# Patient Record
Sex: FEMALE | Race: BLACK OR AFRICAN AMERICAN | NOT HISPANIC OR LATINO | RURAL
[De-identification: names, ages, dates, MRNs, and addresses within clinical notes are randomized per-mention and may not be internally consistent; named-entity substitution may affect disease eponyms.]

---

## 2021-04-20 ENCOUNTER — HISTORICAL (OUTPATIENT)
Dept: ADMINISTRATIVE | Facility: HOSPITAL | Age: 76
End: 2021-04-20

## 2024-02-15 ENCOUNTER — HOSPITAL ENCOUNTER (EMERGENCY)
Facility: HOSPITAL | Age: 79
Discharge: HOME | End: 2024-02-15
Payer: MEDICARE

## 2024-02-15 VITALS
HEIGHT: 63 IN | TEMPERATURE: 99 F | OXYGEN SATURATION: 99 % | WEIGHT: 248.13 LBS | BODY MASS INDEX: 43.96 KG/M2 | SYSTOLIC BLOOD PRESSURE: 138 MMHG | RESPIRATION RATE: 18 BRPM | HEART RATE: 83 BPM | DIASTOLIC BLOOD PRESSURE: 78 MMHG

## 2024-02-15 DIAGNOSIS — E83.42 HYPOMAGNESEMIA: ICD-10-CM

## 2024-02-15 DIAGNOSIS — R09.02 HYPOXIA: ICD-10-CM

## 2024-02-15 DIAGNOSIS — J45.901 SEVERE ASTHMA WITH EXACERBATION, UNSPECIFIED WHETHER PERSISTENT: ICD-10-CM

## 2024-02-15 DIAGNOSIS — R06.2 WHEEZING: ICD-10-CM

## 2024-02-15 DIAGNOSIS — J98.4 PNEUMONITIS: Primary | ICD-10-CM

## 2024-02-15 DIAGNOSIS — J10.1 INFLUENZA A: ICD-10-CM

## 2024-02-15 LAB
ALBUMIN SERPL BCP-MCNC: 2.5 G/DL (ref 3.5–5)
ALBUMIN/GLOB SERPL: 0.6 {RATIO}
ALP SERPL-CCNC: 102 U/L (ref 55–142)
ALT SERPL W P-5'-P-CCNC: 35 U/L (ref 13–56)
ANION GAP SERPL CALCULATED.3IONS-SCNC: 9 MMOL/L (ref 7–16)
AST SERPL W P-5'-P-CCNC: 27 U/L (ref 15–37)
BASOPHILS # BLD AUTO: 0.03 K/UL (ref 0–0.2)
BASOPHILS NFR BLD AUTO: 0.6 % (ref 0–1)
BILIRUB SERPL-MCNC: 0.3 MG/DL (ref ?–1.2)
BUN SERPL-MCNC: 20 MG/DL (ref 7–18)
BUN/CREAT SERPL: 12 (ref 6–20)
CALCIUM SERPL-MCNC: 8.5 MG/DL (ref 8.5–10.1)
CHLORIDE SERPL-SCNC: 102 MMOL/L (ref 98–107)
CO2 SERPL-SCNC: 32 MMOL/L (ref 21–32)
CREAT SERPL-MCNC: 1.67 MG/DL (ref 0.55–1.02)
DIFFERENTIAL METHOD BLD: ABNORMAL
EGFR (NO RACE VARIABLE) (RUSH/TITUS): 31 ML/MIN/1.73M2
EOSINOPHIL # BLD AUTO: 0.14 K/UL (ref 0–0.5)
EOSINOPHIL NFR BLD AUTO: 2.7 % (ref 1–4)
ERYTHROCYTE [DISTWIDTH] IN BLOOD BY AUTOMATED COUNT: 13.7 % (ref 11.5–14.5)
FLUAV AG UPPER RESP QL IA.RAPID: POSITIVE
FLUBV AG UPPER RESP QL IA.RAPID: NEGATIVE
GLOBULIN SER-MCNC: 4 G/DL (ref 2–4)
GLUCOSE SERPL-MCNC: 293 MG/DL (ref 74–106)
GLUCOSE SERPL-MCNC: 311 MG/DL (ref 70–105)
HCO3 UR-SCNC: 31.9 MMOL/L (ref 21–28)
HCT VFR BLD AUTO: 35.4 % (ref 38–47)
HGB BLD-MCNC: 11 G/DL (ref 12–16)
LYMPHOCYTES # BLD AUTO: 1.92 K/UL (ref 1–4.8)
LYMPHOCYTES NFR BLD AUTO: 36.5 % (ref 27–41)
MAGNESIUM SERPL-MCNC: 1.4 MG/DL (ref 1.7–2.3)
MCH RBC QN AUTO: 28.1 PG (ref 27–31)
MCHC RBC AUTO-ENTMCNC: 31.1 G/DL (ref 32–36)
MCV RBC AUTO: 90.3 FL (ref 80–96)
MONOCYTES # BLD AUTO: 0.64 K/UL (ref 0–0.8)
MONOCYTES NFR BLD AUTO: 12.2 % (ref 2–6)
MPC BLD CALC-MCNC: 13 FL (ref 9.4–12.4)
NEUTROPHILS # BLD AUTO: 2.53 K/UL (ref 1.8–7.7)
NEUTROPHILS NFR BLD AUTO: 48 % (ref 53–65)
PCO2 BLDA: 47 MMHG (ref 35–48)
PH SMN: 7.44 [PH] (ref 7.35–7.45)
PLATELET # BLD AUTO: 128 K/UL (ref 150–400)
PO2 BLDA: 148 MMHG (ref 83–108)
POC BASE EXCESS: 6.7 MMOL/L (ref -2–3)
POC CO2: 33.3 MMOL/L
POC SATURATED O2: 99 %
POTASSIUM SERPL-SCNC: 4 MMOL/L (ref 3.5–5.1)
PROT SERPL-MCNC: 6.5 G/DL (ref 6.4–8.2)
RBC # BLD AUTO: 3.92 M/UL (ref 4.2–5.4)
SARS-COV+SARS-COV-2 AG RESP QL IA.RAPID: NEGATIVE
SODIUM SERPL-SCNC: 139 MMOL/L (ref 136–145)
WBC # BLD AUTO: 5.26 K/UL (ref 4.5–11)

## 2024-02-15 PROCEDURE — 94761 N-INVAS EAR/PLS OXIMETRY MLT: CPT | Mod: XB

## 2024-02-15 PROCEDURE — 27000221 HC OXYGEN, UP TO 24 HOURS

## 2024-02-15 PROCEDURE — 83735 ASSAY OF MAGNESIUM: CPT | Performed by: NURSE PRACTITIONER

## 2024-02-15 PROCEDURE — 96366 THER/PROPH/DIAG IV INF ADDON: CPT

## 2024-02-15 PROCEDURE — 63600175 PHARM REV CODE 636 W HCPCS: Performed by: NURSE PRACTITIONER

## 2024-02-15 PROCEDURE — 99285 EMERGENCY DEPT VISIT HI MDM: CPT | Mod: GF

## 2024-02-15 PROCEDURE — 87428 SARSCOV & INF VIR A&B AG IA: CPT | Performed by: NURSE PRACTITIONER

## 2024-02-15 PROCEDURE — 36600 WITHDRAWAL OF ARTERIAL BLOOD: CPT

## 2024-02-15 PROCEDURE — 85025 COMPLETE CBC W/AUTO DIFF WBC: CPT | Performed by: NURSE PRACTITIONER

## 2024-02-15 PROCEDURE — 51702 INSERT TEMP BLADDER CATH: CPT

## 2024-02-15 PROCEDURE — 99285 EMERGENCY DEPT VISIT HI MDM: CPT | Mod: 25

## 2024-02-15 PROCEDURE — 94640 AIRWAY INHALATION TREATMENT: CPT | Mod: XB

## 2024-02-15 PROCEDURE — 82962 GLUCOSE BLOOD TEST: CPT

## 2024-02-15 PROCEDURE — 96365 THER/PROPH/DIAG IV INF INIT: CPT | Mod: 59

## 2024-02-15 PROCEDURE — 82803 BLOOD GASES ANY COMBINATION: CPT

## 2024-02-15 PROCEDURE — 25000242 PHARM REV CODE 250 ALT 637 W/ HCPCS: Performed by: NURSE PRACTITIONER

## 2024-02-15 PROCEDURE — 80053 COMPREHEN METABOLIC PANEL: CPT | Performed by: NURSE PRACTITIONER

## 2024-02-15 PROCEDURE — 96375 TX/PRO/DX INJ NEW DRUG ADDON: CPT | Mod: 59

## 2024-02-15 RX ORDER — AMLODIPINE BESYLATE 5 MG/1
5 TABLET ORAL DAILY
COMMUNITY

## 2024-02-15 RX ORDER — ATORVASTATIN CALCIUM 40 MG/1
40 TABLET, FILM COATED ORAL DAILY
COMMUNITY

## 2024-02-15 RX ORDER — CARVEDILOL 25 MG/1
25 TABLET ORAL 2 TIMES DAILY WITH MEALS
COMMUNITY

## 2024-02-15 RX ORDER — MAGNESIUM SULFATE HEPTAHYDRATE 40 MG/ML
INJECTION, SOLUTION INTRAVENOUS
Status: DISCONTINUED
Start: 2024-02-15 | End: 2024-02-15 | Stop reason: HOSPADM

## 2024-02-15 RX ORDER — IPRATROPIUM BROMIDE AND ALBUTEROL SULFATE 2.5; .5 MG/3ML; MG/3ML
3 SOLUTION RESPIRATORY (INHALATION)
Status: COMPLETED | OUTPATIENT
Start: 2024-02-15 | End: 2024-02-15

## 2024-02-15 RX ORDER — CITALOPRAM 20 MG/1
20 TABLET, FILM COATED ORAL DAILY
COMMUNITY

## 2024-02-15 RX ORDER — IPRATROPIUM BROMIDE AND ALBUTEROL SULFATE 2.5; .5 MG/3ML; MG/3ML
3 SOLUTION RESPIRATORY (INHALATION) EVERY 4 HOURS PRN
Qty: 75 ML | Refills: 0 | Status: SHIPPED | OUTPATIENT
Start: 2024-02-15 | End: 2025-02-14

## 2024-02-15 RX ORDER — CLOPIDOGREL BISULFATE 75 MG/1
75 TABLET ORAL DAILY
COMMUNITY

## 2024-02-15 RX ORDER — MAGNESIUM SULFATE HEPTAHYDRATE 40 MG/ML
4 INJECTION, SOLUTION INTRAVENOUS
Status: COMPLETED | OUTPATIENT
Start: 2024-02-15 | End: 2024-02-15

## 2024-02-15 RX ORDER — FUROSEMIDE 20 MG/1
20 TABLET ORAL DAILY
COMMUNITY

## 2024-02-15 RX ORDER — BENZONATATE 100 MG/1
100 CAPSULE ORAL 3 TIMES DAILY PRN
Qty: 30 CAPSULE | Refills: 0 | Status: SHIPPED | OUTPATIENT
Start: 2024-02-15 | End: 2024-02-25

## 2024-02-15 RX ORDER — ALBUTEROL SULFATE 90 UG/1
2 AEROSOL, METERED RESPIRATORY (INHALATION) EVERY 6 HOURS PRN
COMMUNITY

## 2024-02-15 RX ORDER — CLONIDINE 0.3 MG/24H
1 PATCH, EXTENDED RELEASE TRANSDERMAL
COMMUNITY

## 2024-02-15 RX ORDER — BACLOFEN 10 MG/1
10 TABLET ORAL 3 TIMES DAILY
COMMUNITY

## 2024-02-15 RX ORDER — FAMOTIDINE 20 MG/1
20 TABLET, FILM COATED ORAL NIGHTLY
COMMUNITY

## 2024-02-15 RX ORDER — METHYLPREDNISOLONE SOD SUCC 125 MG
125 VIAL (EA) INJECTION
Status: COMPLETED | OUTPATIENT
Start: 2024-02-15 | End: 2024-02-15

## 2024-02-15 RX ORDER — HYDRALAZINE HYDROCHLORIDE 50 MG/1
50 TABLET, FILM COATED ORAL 3 TIMES DAILY
COMMUNITY

## 2024-02-15 RX ORDER — GLIPIZIDE 2.5 MG/1
5 TABLET, EXTENDED RELEASE ORAL
COMMUNITY

## 2024-02-15 RX ADMIN — IPRATROPIUM BROMIDE AND ALBUTEROL SULFATE 3 ML: 2.5; .5 SOLUTION RESPIRATORY (INHALATION) at 01:02

## 2024-02-15 RX ADMIN — IPRATROPIUM BROMIDE AND ALBUTEROL SULFATE 3 ML: 2.5; .5 SOLUTION RESPIRATORY (INHALATION) at 12:02

## 2024-02-15 RX ADMIN — IPRATROPIUM BROMIDE AND ALBUTEROL SULFATE 3 ML: 2.5; .5 SOLUTION RESPIRATORY (INHALATION) at 02:02

## 2024-02-15 RX ADMIN — MAGNESIUM SULFATE HEPTAHYDRATE 4 G: 40 INJECTION, SOLUTION INTRAVENOUS at 01:02

## 2024-02-15 RX ADMIN — METHYLPREDNISOLONE SODIUM SUCCINATE 125 MG: 125 INJECTION, POWDER, FOR SOLUTION INTRAMUSCULAR; INTRAVENOUS at 01:02

## 2024-02-15 NOTE — ED NOTES
To ED via patient care EMS with c/o SOB unresolved via neb treatment in route, patient on bipap per EMS with Sat being 86-87 % upon arrival to the ED, patient awake and alert with increased resp. Rate and use of accessory muscles noted upon arrival to the ED. Transferred over to [facility] with respiratory at bedside

## 2024-02-15 NOTE — DISCHARGE INSTRUCTIONS
Follow up with primary care provider in 1-2 days.  Duoneb every 4-6 hours as needed for wheezing, shortness of breath.  Tessalon Perles 3 times daily as needed for cough.  Return to emergency department for any new or worsening symptoms.

## 2024-02-15 NOTE — ED PROVIDER NOTES
Encounter Date: 2/15/2024       History     Chief Complaint   Patient presents with    Respiratory Distress     History of flu     Genesis Shah is a 78 y.o. Black or  /female presenting to ED via EMS with wheezing and hypoxia last few hours. She notes that she has not been feeling well x5 days. Notes pos flu per home test 4 days ago. Hx of asthma. Patient was given Albuterol neb and placed on bipap in route to ED. Sats 87% on arrival. Swapped to 100% NRB and sats improved. Patient able to speak in broken sentences. Verifies full code status. Tachypnic and Hypoxic initially. Otherwise, VSS at this time.      The history is provided by the patient, the EMS personnel and a relative.     Review of patient's allergies indicates:  No Known Allergies  Past Medical History:   Diagnosis Date    Anticoagulant long-term use     Asthma     Diabetes mellitus     GERD (gastroesophageal reflux disease)     Stroke x 2     Swelling      Past Surgical History:   Procedure Laterality Date    KNEE SURGERY      SHOULDER SURGERY       Family History   Problem Relation Age of Onset    Hypertension Mother     Diabetes Mother      Social History     Tobacco Use    Smoking status: Never     Passive exposure: Never    Smokeless tobacco: Never   Substance Use Topics    Alcohol use: Never    Drug use: Never     Review of Systems   Unable to perform ROS: Acuity of condition   Constitutional:  Positive for fatigue.   HENT:  Positive for congestion, rhinorrhea, sinus pressure and sinus pain.    Respiratory:  Positive for cough and shortness of breath.    Cardiovascular:  Negative for palpitations.   Gastrointestinal:  Negative for abdominal distention, abdominal pain, diarrhea, nausea and vomiting.   Musculoskeletal:  Positive for myalgias.   Psychiatric/Behavioral:  Negative for confusion.        Physical Exam     Initial Vitals [02/15/24 1248]   BP Pulse Resp Temp SpO2   (!) 185/85 77 (!) 26 97.9 °F (36.6 °C) (!) 86 %       MAP       --         Physical Exam    Nursing note and vitals reviewed.  Constitutional: She appears well-developed. She is not diaphoretic. She is Obese . She is cooperative. She appears distressed.   HENT:   Head: Normocephalic and atraumatic.   Right Ear: External ear normal.   Left Ear: External ear normal.   Nose: Nose normal.   Mouth/Throat: Oropharynx is clear and moist.   Eyes: Conjunctivae are normal. Pupils are equal, round, and reactive to light. No scleral icterus.   Neck: Neck supple. No tracheal deviation present. No JVD present.   Normal range of motion.  Cardiovascular:  Regular rhythm, normal heart sounds and intact distal pulses.           Pulmonary/Chest: Accessory muscle usage present. No stridor. Tachypnea noted. She is in respiratory distress. She has wheezes in the right upper field, the right middle field, the right lower field, the left upper field and the left lower field. She has rhonchi in the right upper field, the right middle field, the right lower field, the left upper field and the left lower field.   Abdominal: Abdomen is soft. Bowel sounds are normal. She exhibits distension. There is no abdominal tenderness. There is no rebound.   Musculoskeletal:         General: Edema present. No tenderness.      Cervical back: Normal range of motion and neck supple.      Right lower le+ Pitting Edema present.      Left lower le+ Pitting Edema present.     Neurological: She is alert. GCS score is 15. GCS eye subscore is 4. GCS verbal subscore is 5. GCS motor subscore is 6.   Skin: Skin is warm and dry. Capillary refill takes less than 2 seconds.         Medical Screening Exam   See Full Note    ED Course   Procedures  Labs Reviewed   COMPREHENSIVE METABOLIC PANEL - Abnormal; Notable for the following components:       Result Value    Glucose 293 (*)     BUN 20 (*)     Creatinine 1.67 (*)     Albumin 2.5 (*)     eGFR 31 (*)     All other components within normal limits   MAGNESIUM -  Abnormal; Notable for the following components:    Magnesium 1.4 (*)     All other components within normal limits   SARS-COV2 (COVID) W/ FLU ANTIGEN - Abnormal; Notable for the following components:    Influenza A Positive (*)     All other components within normal limits    Narrative:     Negative SARS-CoV results should not be used as the sole basis for treatment or patient management decisions; negative results should be considered in the context of a patient's recent exposures, history and the presene of clinical signs and symptoms consistent with COVID-19.  Negative results should be treated as presumptive and confirmed by molecular assay, if necessary for patient management.   CBC WITH DIFFERENTIAL - Abnormal; Notable for the following components:    RBC 3.92 (*)     Hemoglobin 11.0 (*)     Hematocrit 35.4 (*)     MCHC 31.1 (*)     Platelet Count 128 (*)     MPV 13.0 (*)     Neutrophils % 48.0 (*)     Monocytes % 12.2 (*)     All other components within normal limits   POCT GLUCOSE MONITORING CONTINUOUS - Abnormal; Notable for the following components:    POC Glucose 311 (*)     All other components within normal limits   CBC W/ AUTO DIFFERENTIAL    Narrative:     The following orders were created for panel order CBC auto differential.  Procedure                               Abnormality         Status                     ---------                               -----------         ------                     CBC with Differential[[id number]]       Abnormal            Final result                 Please view results for these tests on the individual orders.          Imaging Results              X-Ray Chest AP Portable (Final result)  Result time 02/15/24 13:16:23      Final result by Ahsan Jon II, MD (02/15/24 13:16:23)                   Impression:      Findings suggest mild cardiac decompensation and / or pneumonitis.      Electronically signed by: Ahsan Jon  Date:    02/15/2024  Time:    13:16                Narrative:    EXAMINATION:  XR CHEST AP PORTABLE    CLINICAL HISTORY:  Wheezing    COMPARISON:  23 December 2019    TECHNIQUE:  XR CHEST AP PORTABLE    FINDINGS:  The heart and mediastinum are stable in size and configuration.  The pulmonary vascularity is slightly increased with bilateral increased interstitial lung density.  No other lung infiltrates, effusions, pneumothorax or other abnormality is demonstrated.                                       Medications   magnesium sulfate in water (MAGNESIUM SULFATE 2 GRAM/50 ML) 2 gram/50 mL IVPB (has no administration in time range)   magnesium sulfate in water (MAGNESIUM SULFATE 2 GRAM/50 ML) 2 gram/50 mL IVPB (has no administration in time range)   albuterol-ipratropium 2.5 mg-0.5 mg/3 mL nebulizer solution 3 mL (3 mLs Nebulization Given 2/15/24 1259)   methylPREDNISolone sodium succinate injection 125 mg (125 mg Intravenous Given 2/15/24 1301)   albuterol-ipratropium 2.5 mg-0.5 mg/3 mL nebulizer solution 3 mL (3 mLs Nebulization Given 2/15/24 1310)   albuterol-ipratropium 2.5 mg-0.5 mg/3 mL nebulizer solution 3 mL (3 mLs Nebulization Given 2/15/24 1304)   magnesium sulfate 2g in water 50mL IVPB (premix) (0 g Intravenous Stopped 2/15/24 1535)   albuterol-ipratropium 2.5 mg-0.5 mg/3 mL nebulizer solution 3 mL (3 mLs Nebulization Given 2/15/24 1410)     Medical Decision Making  The presentation of Genesis Shah is not consistent with cardiac wheeze, congestive heart failure, pneumothorax, pulmonary emboli, or other emergent process.    Additionally, Genesis Shah has no evidence of of pneumonia, sepsis, or other indication for antibiotics.    Upon discharge, Genesis Shah has no evidence of respiratory failure or signs of tiring, and is comfortable without respiratory distress after duonebs and steroids. She returned to baseline resp status per her and her daughter. She is maintaining 97% sats RA. Patient was offered admission  for monitoring but refused. Will provide nebulizer and d/c script for duonebs and Tessalin Perles. Patient and daughter has been given strict return precautions. Out of window for Tamiflu and will not give steroids at this point since she was given Solumedrol in ED and already hyperglycemic. Daughter also notes hx of CKD and sees nephrologist. Instructed to f/u with PCP in 1-2 days for re-evaluation.    Data Reviewed/Counseling: I have reviewed the patient's vital signs, nursing notes, and other relevant tests/information. I had a detailed discussion regarding the historical points, exam findings, and any diagnostic results supporting the discharge diagnosis. I also discussed the need for outpatient follow-up and the need to return to the ED if symptoms worsen or if there are any questions or concerns that arise at home.     Dx: Pneumonitis, Influenza A, hypomagnesemia    Amount and/or Complexity of Data Reviewed  Independent Historian:      Details: Genesis Shah is a 78 y.o. Black or  /female presenting to ED via EMS with wheezing and hypoxia last few hours. She notes that she has not been feeling well x5 days. Notes pos flu per home test 4 days ago. Hx of asthma. Patient was given Albuterol neb and placed on bipap in route to ED. Sats 87% on arrival. Swapped to 100% NRB and sats improved. Patient able to speak in broken sentences. Verifies full code status. Tachypnic and Hypoxic initially. Otherwise, VSS at this time.    External Data Reviewed:      Details: ANGELIA 12/12/2019 5:42 PM   * The estimated LV ejection fraction is normal at 55-60%.       * There is mild concentric LV hypertrophy.       * Left Atrium chamber is mildly dilated.       * Negative saline contrast study for interatrial shunt.       * No hemodynamically significant valvular disease.       * The estimated RV systolic pressure was not measured in the absence       of an adequate TR jet.       * There is no intracardiac  mass or thrombus identified.       Electronically signed by: Jake Christianson MD   Labs: ordered.     Details: CBC- Rbc 3.92, H&H 11.0/35.4, Platelets 128  CMP- BUN 20, Creatinine 1.67 (no comparison available), Glucose 293  Magnesium- 1.4  ABG- pH 7.44, CO2 47, O2 148 (oxygen decreased to 3L NC)  Accucheck- 311  COVID- negative  Flu- A pos  Radiology: ordered.     Details: XR chest- Findings suggest mild cardiac decompensation and /or pneumonitis    Risk  Prescription drug management.               ED Course as of 02/15/24 1604   Thu Feb 15, 2024   1305 Patient reports to be feeling better after Duonebs. She is able to speak in full sentences. Still with wheezing present but greatly improved. Patient notes that she does wear O2 at home PRN. Hx of asthma [LP]   1333 Patient improving. Sats 99% on 5L NC. Breath sounds improved but still with scattered wheezing. [LP]   1352 Discussed results with patient and family. Family notes she appears to be back to her baseline resp status. Patient notes she is not quite back to baseline but close.  [LP]   1445 Patient reports feeling much better and back to baseline resp status. Sats maintaining 99% on 2L NC. Still with scattered wheezing. Discussed admission but patient states that she would rather d/c home. Mag still infusing. Will reassess once mag infusion is complete. [LP]      ED Course User Index  [LP] Cande Veliz, FNP                           Clinical Impression:   Final diagnoses:  [R06.2] Wheezing  [R09.02] Hypoxia  [J98.4] Pneumonitis (Primary)  [J10.1] Influenza A  [E83.42] Hypomagnesemia  [J45.901] Severe asthma with exacerbation, unspecified whether persistent        ED Disposition Condition    Discharge Stable          ED Prescriptions       Medication Sig Dispense Start Date End Date Auth. Provider    albuterol-ipratropium (DUO-NEB) 2.5 mg-0.5 mg/3 mL nebulizer solution Take 3 mLs by nebulization every 4 (four) hours as needed for Wheezing or Shortness of  Breath. Rescue 75 mL 2/15/2024 2/14/2025 Cande Veliz, LITAP    benzonatate (TESSALON) 100 MG capsule Take 1 capsule (100 mg total) by mouth 3 (three) times daily as needed for Cough. 30 capsule 2/15/2024 2/25/2024 Cande Veliz, LITAP          Follow-up Information    None          Cande Veliz, LITAP  02/15/24 8147